# Patient Record
Sex: FEMALE | Race: WHITE | NOT HISPANIC OR LATINO | URBAN - METROPOLITAN AREA
[De-identification: names, ages, dates, MRNs, and addresses within clinical notes are randomized per-mention and may not be internally consistent; named-entity substitution may affect disease eponyms.]

---

## 2020-07-16 ENCOUNTER — OFFICE VISIT (OUTPATIENT)
Dept: URBAN - METROPOLITAN AREA CLINIC 113 | Facility: CLINIC | Age: 50
End: 2020-07-16

## 2020-07-25 ENCOUNTER — TELEPHONE ENCOUNTER (OUTPATIENT)
Dept: URBAN - METROPOLITAN AREA CLINIC 13 | Facility: CLINIC | Age: 50
End: 2020-07-25

## 2020-07-26 ENCOUNTER — TELEPHONE ENCOUNTER (OUTPATIENT)
Dept: URBAN - METROPOLITAN AREA CLINIC 13 | Facility: CLINIC | Age: 50
End: 2020-07-26

## 2020-07-26 RX ORDER — CHOLECALCIFEROL (VITAMIN D3) 125 MCG
TAKE 1 CAPSULE DAILY CAPSULE ORAL
Refills: 0 | Status: ACTIVE | COMMUNITY

## 2020-07-26 RX ORDER — LEVOTHYROXINE SODIUM 150 UG/1
TAKE 1 TABLET DAILY TABLET ORAL
Refills: 0 | Status: ACTIVE | COMMUNITY

## 2020-07-26 RX ORDER — ESOMEPRAZOLE MAGNESIUM 40 MG
TAKE 1 CAPSULE ONCE DAILY CAPSULE,DELAYED RELEASE (ENTERIC COATED) ORAL
Refills: 0 | Status: ACTIVE | COMMUNITY

## 2020-07-26 RX ORDER — VALACYCLOVIR HYDROCHLORIDE 500 MG/1
TAKE 2 TABLET DAILY TABLET, FILM COATED ORAL
Refills: 0 | Status: ACTIVE | COMMUNITY
Start: 2020-01-13

## 2020-07-26 RX ORDER — FAMOTIDINE 20 MG/1
TAKE 1 TABLET DAILY AS DIRECTED TABLET ORAL
Qty: 30 | Refills: 0 | Status: ACTIVE | COMMUNITY

## 2020-07-26 RX ORDER — ATENOLOL 25 MG/1
TAKE 1 TABLET TWICE DAILY TABLET ORAL
Refills: 0 | Status: ACTIVE | COMMUNITY
Start: 2020-02-03

## 2020-07-26 RX ORDER — DOCUSATE SODIUM 100 MG/1
TAKE 1 TABLET DAILY AS DIRECTED. UNKNOWN DOSAGE TABLET ORAL
Refills: 0 | Status: ACTIVE | COMMUNITY

## 2020-07-26 RX ORDER — CHLORHEXIDINE GLUCONATE 4 %
TAKE 1 TABLET DAILY AS DIRECTED LIQUID (ML) TOPICAL
Refills: 0 | Status: ACTIVE | COMMUNITY

## 2020-07-26 RX ORDER — GABAPENTIN 300 MG/1
TAKE 1 CAPSULE TWICE DAILY CAPSULE ORAL
Refills: 0 | Status: ACTIVE | COMMUNITY

## 2020-07-26 RX ORDER — PREDNISONE 5 MG/1
TAKE AS DIRECTED.  PRN TABLET ORAL
Refills: 0 | Status: ACTIVE | COMMUNITY
Start: 2020-02-03

## 2020-07-26 RX ORDER — BUPROPION HYDROCHLORIDE 150 MG/1
TABLET, FILM COATED, EXTENDED RELEASE ORAL
Qty: 30 | Refills: 0 | Status: ACTIVE | COMMUNITY
Start: 2020-06-16

## 2020-07-28 ENCOUNTER — OFFICE VISIT (OUTPATIENT)
Dept: URBAN - METROPOLITAN AREA SURGERY CENTER 25 | Facility: SURGERY CENTER | Age: 50
End: 2020-07-28

## 2020-08-18 ENCOUNTER — CLAIMS CREATED FROM THE CLAIM WINDOW (OUTPATIENT)
Dept: URBAN - METROPOLITAN AREA CLINIC 4 | Facility: CLINIC | Age: 50
End: 2020-08-18
Payer: MEDICARE

## 2020-08-18 ENCOUNTER — OFFICE VISIT (OUTPATIENT)
Dept: URBAN - METROPOLITAN AREA SURGERY CENTER 25 | Facility: SURGERY CENTER | Age: 50
End: 2020-08-18
Payer: MEDICARE

## 2020-08-18 DIAGNOSIS — K21.9 ACID REFLUX: ICD-10-CM

## 2020-08-18 DIAGNOSIS — K22.8 OTHER SPECIFIED DISEASES OF ESOPHAGUS: ICD-10-CM

## 2020-08-18 DIAGNOSIS — K21.0 GASTRO-ESOPHAGEAL REFLUX DISEASE WITH ESOPHAGITIS: ICD-10-CM

## 2020-08-18 DIAGNOSIS — K31.89 ACQUIRED DEFORMITY OF DUODENUM: ICD-10-CM

## 2020-08-18 DIAGNOSIS — K29.60 OTHER GASTRITIS WITHOUT BLEEDING: ICD-10-CM

## 2020-08-18 PROCEDURE — 43239 EGD BIOPSY SINGLE/MULTIPLE: CPT | Performed by: INTERNAL MEDICINE

## 2020-08-18 PROCEDURE — 88312 SPECIAL STAINS GROUP 1: CPT | Performed by: PATHOLOGY

## 2020-08-18 PROCEDURE — 88305 TISSUE EXAM BY PATHOLOGIST: CPT | Performed by: PATHOLOGY

## 2020-08-18 PROCEDURE — G8907 PT DOC NO EVENTS ON DISCHARG: HCPCS | Performed by: INTERNAL MEDICINE

## 2020-08-18 RX ORDER — ATENOLOL 25 MG/1
TAKE 1 TABLET TWICE DAILY TABLET ORAL
Refills: 0 | Status: ACTIVE | COMMUNITY
Start: 2020-02-03

## 2020-08-18 RX ORDER — PREDNISONE 5 MG/1
TAKE AS DIRECTED.  PRN TABLET ORAL
Refills: 0 | Status: ACTIVE | COMMUNITY
Start: 2020-02-03

## 2020-08-18 RX ORDER — DOCUSATE SODIUM 100 MG/1
TAKE 1 TABLET DAILY AS DIRECTED. UNKNOWN DOSAGE TABLET ORAL
Refills: 0 | Status: ACTIVE | COMMUNITY

## 2020-08-18 RX ORDER — FAMOTIDINE 20 MG/1
TAKE 1 TABLET DAILY AS DIRECTED TABLET ORAL
Qty: 30 | Refills: 0 | Status: ACTIVE | COMMUNITY

## 2020-08-18 RX ORDER — CHLORHEXIDINE GLUCONATE 4 %
TAKE 1 TABLET DAILY AS DIRECTED LIQUID (ML) TOPICAL
Refills: 0 | Status: ACTIVE | COMMUNITY

## 2020-08-18 RX ORDER — GABAPENTIN 300 MG/1
TAKE 1 CAPSULE TWICE DAILY CAPSULE ORAL
Refills: 0 | Status: ACTIVE | COMMUNITY

## 2020-08-18 RX ORDER — VALACYCLOVIR HYDROCHLORIDE 500 MG/1
TAKE 2 TABLET DAILY TABLET, FILM COATED ORAL
Refills: 0 | Status: ACTIVE | COMMUNITY
Start: 2020-01-13

## 2020-08-18 RX ORDER — LEVOTHYROXINE SODIUM 150 UG/1
TAKE 1 TABLET DAILY TABLET ORAL
Refills: 0 | Status: ACTIVE | COMMUNITY

## 2020-08-18 RX ORDER — BUPROPION HYDROCHLORIDE 150 MG/1
TABLET, FILM COATED, EXTENDED RELEASE ORAL
Qty: 30 | Refills: 0 | Status: ACTIVE | COMMUNITY
Start: 2020-06-16

## 2020-08-18 RX ORDER — ESOMEPRAZOLE MAGNESIUM 40 MG
TAKE 1 CAPSULE ONCE DAILY CAPSULE,DELAYED RELEASE (ENTERIC COATED) ORAL
Refills: 0 | Status: ACTIVE | COMMUNITY

## 2020-08-18 RX ORDER — CHOLECALCIFEROL (VITAMIN D3) 125 MCG
TAKE 1 CAPSULE DAILY CAPSULE ORAL
Refills: 0 | Status: ACTIVE | COMMUNITY

## 2020-08-20 ENCOUNTER — TELEPHONE ENCOUNTER (OUTPATIENT)
Dept: URBAN - METROPOLITAN AREA CLINIC 113 | Facility: CLINIC | Age: 50
End: 2020-08-20

## 2020-08-21 ENCOUNTER — OFFICE VISIT (OUTPATIENT)
Dept: URBAN - METROPOLITAN AREA SURGERY CENTER 25 | Facility: SURGERY CENTER | Age: 50
End: 2020-08-21

## 2020-10-02 ENCOUNTER — OFFICE VISIT (OUTPATIENT)
Dept: URBAN - METROPOLITAN AREA SURGERY CENTER 25 | Facility: SURGERY CENTER | Age: 50
End: 2020-10-02

## 2020-10-21 ENCOUNTER — OFFICE VISIT (OUTPATIENT)
Dept: URBAN - METROPOLITAN AREA CLINIC 113 | Facility: CLINIC | Age: 50
End: 2020-10-21

## 2023-09-25 ENCOUNTER — OFFICE VISIT (OUTPATIENT)
Dept: URBAN - METROPOLITAN AREA CLINIC 113 | Facility: CLINIC | Age: 53
End: 2023-09-25

## 2023-10-11 ENCOUNTER — OFFICE VISIT (OUTPATIENT)
Dept: URBAN - METROPOLITAN AREA CLINIC 113 | Facility: CLINIC | Age: 53
End: 2023-10-11
Payer: MEDICARE

## 2023-10-11 VITALS
DIASTOLIC BLOOD PRESSURE: 98 MMHG | TEMPERATURE: 97.2 F | WEIGHT: 278 LBS | BODY MASS INDEX: 42.13 KG/M2 | HEART RATE: 55 BPM | SYSTOLIC BLOOD PRESSURE: 127 MMHG | HEIGHT: 68 IN

## 2023-10-11 DIAGNOSIS — K21.9 GASTROESOPHAGEAL REFLUX DISEASE, UNSPECIFIED WHETHER ESOPHAGITIS PRESENT: ICD-10-CM

## 2023-10-11 DIAGNOSIS — K57.92 DIVERTICULITIS: ICD-10-CM

## 2023-10-11 DIAGNOSIS — R74.8 ELEVATED LIVER ENZYMES: ICD-10-CM

## 2023-10-11 DIAGNOSIS — K59.01 CONSTIPATION: ICD-10-CM

## 2023-10-11 PROBLEM — 235595009: Status: ACTIVE | Noted: 2023-10-11

## 2023-10-11 PROBLEM — 1231824009: Status: ACTIVE | Noted: 2023-10-11

## 2023-10-11 PROBLEM — 14760008: Status: ACTIVE | Noted: 2023-10-11

## 2023-10-11 PROBLEM — 307496006: Status: ACTIVE | Noted: 2023-10-11

## 2023-10-11 PROCEDURE — 99204 OFFICE O/P NEW MOD 45 MIN: CPT

## 2023-10-11 RX ORDER — GABAPENTIN 300 MG/1
TAKE 1 CAPSULE TWICE DAILY CAPSULE ORAL
Refills: 0 | Status: ACTIVE | COMMUNITY

## 2023-10-11 RX ORDER — PLECANATIDE 3 MG/1
1 TABLET TABLET ORAL ONCE A DAY
Qty: 90 | Refills: 3 | OUTPATIENT
Start: 2023-10-11 | End: 2024-10-05

## 2023-10-11 RX ORDER — CHOLECALCIFEROL (VITAMIN D3) 125 MCG
TAKE 1 CAPSULE DAILY CAPSULE ORAL
Refills: 0 | Status: ACTIVE | COMMUNITY

## 2023-10-11 RX ORDER — DOCUSATE SODIUM 100 MG/1
TAKE 1 TABLET DAILY AS DIRECTED. UNKNOWN DOSAGE TABLET ORAL
Refills: 0 | Status: ACTIVE | COMMUNITY

## 2023-10-11 RX ORDER — CHLORHEXIDINE GLUCONATE 4 %
TAKE 1 TABLET DAILY AS DIRECTED LIQUID (ML) TOPICAL
Refills: 0 | Status: ACTIVE | COMMUNITY

## 2023-10-11 RX ORDER — ATENOLOL 25 MG/1
TAKE 1 TABLET TWICE DAILY TABLET ORAL
Refills: 0 | Status: ACTIVE | COMMUNITY
Start: 2020-02-03

## 2023-10-11 RX ORDER — FAMOTIDINE 20 MG/1
TAKE 1 TABLET DAILY AS DIRECTED TABLET ORAL
OUTPATIENT

## 2023-10-11 RX ORDER — FAMOTIDINE 20 MG/1
TAKE 1 TABLET DAILY AS DIRECTED TABLET ORAL
Qty: 30 | Refills: 0 | Status: ACTIVE | COMMUNITY

## 2023-10-11 RX ORDER — LEVOTHYROXINE SODIUM 150 UG/1
TAKE 1 TABLET DAILY TABLET ORAL
Refills: 0 | Status: ACTIVE | COMMUNITY

## 2023-10-11 RX ORDER — BUPROPION HYDROCHLORIDE 150 MG/1
TABLET, FILM COATED, EXTENDED RELEASE ORAL
Qty: 30 | Refills: 0 | Status: ON HOLD | COMMUNITY
Start: 2020-06-16

## 2023-10-11 RX ORDER — PREDNISONE 5 MG/1
TAKE AS DIRECTED.  PRN TABLET ORAL
Refills: 0 | Status: ACTIVE | COMMUNITY
Start: 2020-02-03

## 2023-10-11 RX ORDER — ESOMEPRAZOLE MAGNESIUM 40 MG
TAKE 1 CAPSULE ONCE DAILY CAPSULE,DELAYED RELEASE (ENTERIC COATED) ORAL
Refills: 0 | Status: ACTIVE | COMMUNITY

## 2023-10-11 RX ORDER — ESOMEPRAZOLE MAGNESIUM 40 MG
TAKE 1 CAPSULE ONCE DAILY CAPSULE,DELAYED RELEASE (ENTERIC COATED) ORAL
OUTPATIENT

## 2023-10-11 RX ORDER — VALACYCLOVIR HYDROCHLORIDE 500 MG/1
TAKE 2 TABLET DAILY TABLET, FILM COATED ORAL
Refills: 0 | Status: ACTIVE | COMMUNITY
Start: 2020-01-13

## 2023-10-11 NOTE — HPI-TODAY'S VISIT:
53-year-old female with a history of anemia, reflux, fibromyalgia, prior cholecystectomy, Hashimoto's thyroiditis, rheumatoid arthritis, Sjogren's syndrome, Raynaud's syndrome, pheochromocytoma status post left adrenalectomy, PVCs, lung nodules related to RA, splenomegaly, cardiomegaly, irritable bowel syndrome, fatty liver disease, PAD presents for evaluation of her liver.   She was originally seen by Dr. Benoit in 2020 for multiple GI complaints.  She was recommended an EGD and colonoscopy.  She underwent EGD on 8/18/2020.  This revealed a normal esophagus and normal duodenum.  There was a medium amount of food residue in the stomach as well as gastritis.  Gastric biopsies revealed chemical/reactive gastropathy without evidence of H. pylori.  Esophageal biopsies revealed reflux type changes without evidence of Delgado's esophagus or eosinophilic esophagitis.  She had been lost to follow-up and never underwent colonoscopy. Upper GI series 5/20/2023:Moderate hiatal hernia with moderate to large volume gastroesophageal reflux to the level of the thoracic inlet. CT scan of the abdomen/pelvis 9/21/2023: Prior cholecystectomy.  Mild distal descending colonic diverticulitis.  Noncalcified less than 5 mm nodules in the lung bases are stable compared to 2020 consistent with benign etiology.  Stable postsurgical changes in the right kidney. Labs 9/14/2023:Unremarkable urinalysis, negative urine culture, positive COVID test. Labs 8/9/2023:Normal BNP, hemoglobin A1c 6.1, unremarkable BMP, normal lipid panel, low TSH of 0.341, normal T4, normal microalbumin.  She has had fatty liver disease for the past few years. She had an US with elastography which showed mild fibrosis. Dr. Cantrell has been watching her LFTs. She has been following a KETO diet. She has lost almost 20 pounds. Her LFTs "have improved by half." She had an episode of diverticulitis in September. This was described as severe LLQ. This resovled with Augmentin. She has had several colonoscopies in the past. Her last colonoscopy was in 2016/2017. This was notable for a benign polyp. Her brother everardo passed away. She does not drink ETOH.  She does have constipation predominant bowel habits. She takes Sennakot 1-2 times per night. SHe takes fiber every night as well. She typically has diarrhea predominant bowel habits however she has become constipated since beginning her keto diet. She is considering undergoing gastric sleeve with Dr. Erick Rivera. She does have generalized abdominal pain and bloating. The bloating can occur with a sip of water, per patient. She is very stressed at home. Her dog just had 11 puppies and she is caring for all of her children and .

## 2023-10-12 LAB
A/G RATIO: 1.8
ABSOLUTE BASOPHILS: 49
ABSOLUTE EOSINOPHILS: 211
ABSOLUTE LYMPHOCYTES: 1858
ABSOLUTE MONOCYTES: 529
ABSOLUTE NEUTROPHILS: 2754
ALBUMIN: 4.2
ALKALINE PHOSPHATASE: 61
ALT (SGPT): 55
AST (SGOT): 36
BASOPHILS: 0.9
BILIRUBIN, TOTAL: 1.1
BUN/CREATININE RATIO: (no result)
BUN: 18
CALCIUM: 9.3
CARBON DIOXIDE, TOTAL: 24
CHLORIDE: 106
CREATININE: 0.83
EGFR: 84
EOSINOPHILS: 3.9
GLOBULIN, TOTAL: 2.3
GLUCOSE: 83
HEMATOCRIT: 43.3
HEMOGLOBIN: 14.2
LYMPHOCYTES: 34.4
MCH: 29.6
MCHC: 32.8
MCV: 90.2
MONOCYTES: 9.8
MPV: 11.4
NEUTROPHILS: 51
PLATELET COUNT: 187
POTASSIUM: 4.2
PROTEIN, TOTAL: 6.5
RDW: 13
RED BLOOD CELL COUNT: 4.8
SODIUM: 142
WHITE BLOOD CELL COUNT: 5.4

## 2023-10-27 ENCOUNTER — CLAIMS CREATED FROM THE CLAIM WINDOW (OUTPATIENT)
Dept: URBAN - METROPOLITAN AREA SURGERY CENTER 25 | Facility: SURGERY CENTER | Age: 53
End: 2023-10-27
Payer: MEDICARE

## 2023-10-27 ENCOUNTER — CLAIMS CREATED FROM THE CLAIM WINDOW (OUTPATIENT)
Dept: URBAN - METROPOLITAN AREA CLINIC 4 | Facility: CLINIC | Age: 53
End: 2023-10-27
Payer: MEDICARE

## 2023-10-27 DIAGNOSIS — K63.89 OTHER SPECIFIED DISEASES OF INTESTINE: ICD-10-CM

## 2023-10-27 DIAGNOSIS — D12.2 ADENOMATOUS POLYP OF ASCENDING COLON: ICD-10-CM

## 2023-10-27 DIAGNOSIS — Z12.11 COLON CANCER SCREENING (HIGH RISK): ICD-10-CM

## 2023-10-27 DIAGNOSIS — K64.1 SECOND DEGREE HEMORRHOIDS: ICD-10-CM

## 2023-10-27 DIAGNOSIS — K57.30 COLON, DIVERTICULOSIS: ICD-10-CM

## 2023-10-27 DIAGNOSIS — Z12.11 COLON CANCER SCREENING: ICD-10-CM

## 2023-10-27 PROCEDURE — G8907 PT DOC NO EVENTS ON DISCHARG: HCPCS | Performed by: INTERNAL MEDICINE

## 2023-10-27 PROCEDURE — 88305 TISSUE EXAM BY PATHOLOGIST: CPT | Performed by: PATHOLOGY

## 2023-10-27 PROCEDURE — 45380 COLONOSCOPY AND BIOPSY: CPT | Performed by: INTERNAL MEDICINE

## 2023-10-27 PROCEDURE — 00811 ANES LWR INTST NDSC NOS: CPT | Performed by: ANESTHESIOLOGIST ASSISTANT

## 2023-10-27 PROCEDURE — 00811 ANES LWR INTST NDSC NOS: CPT | Performed by: ANESTHESIOLOGY

## 2023-10-27 RX ORDER — GABAPENTIN 300 MG/1
TAKE 1 CAPSULE TWICE DAILY CAPSULE ORAL
Refills: 0 | Status: ACTIVE | COMMUNITY

## 2023-10-27 RX ORDER — DOCUSATE SODIUM 100 MG/1
TAKE 1 TABLET DAILY AS DIRECTED. UNKNOWN DOSAGE TABLET ORAL
Refills: 0 | Status: ACTIVE | COMMUNITY

## 2023-10-27 RX ORDER — PLECANATIDE 3 MG/1
1 TABLET TABLET ORAL ONCE A DAY
Qty: 90 | Refills: 3 | Status: ACTIVE | COMMUNITY
Start: 2023-10-11 | End: 2024-10-05

## 2023-10-27 RX ORDER — FAMOTIDINE 20 MG/1
TAKE 1 TABLET DAILY AS DIRECTED TABLET ORAL
Status: ACTIVE | COMMUNITY

## 2023-10-27 RX ORDER — LEVOTHYROXINE SODIUM 150 UG/1
TAKE 1 TABLET DAILY TABLET ORAL
Refills: 0 | Status: ACTIVE | COMMUNITY

## 2023-10-27 RX ORDER — CHLORHEXIDINE GLUCONATE 4 %
TAKE 1 TABLET DAILY AS DIRECTED LIQUID (ML) TOPICAL
Refills: 0 | Status: ACTIVE | COMMUNITY

## 2023-10-27 RX ORDER — PREDNISONE 5 MG/1
TAKE AS DIRECTED.  PRN TABLET ORAL
Refills: 0 | Status: ACTIVE | COMMUNITY
Start: 2020-02-03

## 2023-10-27 RX ORDER — BUPROPION HYDROCHLORIDE 150 MG/1
TABLET, FILM COATED, EXTENDED RELEASE ORAL
Qty: 30 | Refills: 0 | Status: ON HOLD | COMMUNITY
Start: 2020-06-16

## 2023-10-27 RX ORDER — CHOLECALCIFEROL (VITAMIN D3) 125 MCG
TAKE 1 CAPSULE DAILY CAPSULE ORAL
Refills: 0 | Status: ACTIVE | COMMUNITY

## 2023-10-27 RX ORDER — ATENOLOL 25 MG/1
TAKE 1 TABLET TWICE DAILY TABLET ORAL
Refills: 0 | Status: ACTIVE | COMMUNITY
Start: 2020-02-03

## 2023-10-27 RX ORDER — ESOMEPRAZOLE MAGNESIUM 40 MG
TAKE 1 CAPSULE ONCE DAILY CAPSULE,DELAYED RELEASE (ENTERIC COATED) ORAL
Status: ACTIVE | COMMUNITY

## 2023-10-27 RX ORDER — VALACYCLOVIR HYDROCHLORIDE 500 MG/1
TAKE 2 TABLET DAILY TABLET, FILM COATED ORAL
Refills: 0 | Status: ACTIVE | COMMUNITY
Start: 2020-01-13

## 2023-12-11 ENCOUNTER — OFFICE VISIT (OUTPATIENT)
Dept: URBAN - METROPOLITAN AREA CLINIC 113 | Facility: CLINIC | Age: 53
End: 2023-12-11

## 2024-01-24 ENCOUNTER — OFFICE VISIT (OUTPATIENT)
Dept: URBAN - METROPOLITAN AREA CLINIC 113 | Facility: CLINIC | Age: 54
End: 2024-01-24
Payer: MEDICARE

## 2024-01-24 VITALS
WEIGHT: 271.2 LBS | DIASTOLIC BLOOD PRESSURE: 80 MMHG | HEART RATE: 69 BPM | RESPIRATION RATE: 20 BRPM | BODY MASS INDEX: 41.1 KG/M2 | HEIGHT: 68 IN | TEMPERATURE: 97.7 F | SYSTOLIC BLOOD PRESSURE: 125 MMHG

## 2024-01-24 DIAGNOSIS — K21.9 GASTROESOPHAGEAL REFLUX DISEASE, UNSPECIFIED WHETHER ESOPHAGITIS PRESENT: ICD-10-CM

## 2024-01-24 DIAGNOSIS — K57.92 DIVERTICULITIS: ICD-10-CM

## 2024-01-24 DIAGNOSIS — R14.0 ABDOMINAL BLOATING: ICD-10-CM

## 2024-01-24 PROCEDURE — 76700 US EXAM ABDOM COMPLETE: CPT

## 2024-01-24 PROCEDURE — 99214 OFFICE O/P EST MOD 30 MIN: CPT

## 2024-01-24 RX ORDER — PREDNISONE 5 MG/1
TAKE AS DIRECTED.  PRN TABLET ORAL
Refills: 0 | Status: ACTIVE | COMMUNITY
Start: 2020-02-03

## 2024-01-24 RX ORDER — PLECANATIDE 3 MG/1
1 TABLET TABLET ORAL ONCE A DAY
Qty: 90 | Refills: 3 | OUTPATIENT

## 2024-01-24 RX ORDER — ATENOLOL 50 MG/1
1 TABLET TABLET ORAL ONCE A DAY
Status: ACTIVE | COMMUNITY

## 2024-01-24 RX ORDER — FAMOTIDINE 40 MG/1
1 TABLET AT BEDTIME TABLET, FILM COATED ORAL ONCE A DAY
Qty: 90 TABLET | Refills: 3 | OUTPATIENT

## 2024-01-24 RX ORDER — CHOLECALCIFEROL (VITAMIN D3) 125 MCG
TAKE 1 CAPSULE DAILY CAPSULE ORAL
Refills: 0 | Status: ACTIVE | COMMUNITY

## 2024-01-24 RX ORDER — LEVOTHYROXINE SODIUM 150 UG/1
TAKE 1 TABLET DAILY TABLET ORAL
Refills: 0 | Status: ACTIVE | COMMUNITY

## 2024-01-24 RX ORDER — VALACYCLOVIR HYDROCHLORIDE 500 MG/1
TAKE 2 TABLET DAILY TABLET, FILM COATED ORAL
Refills: 0 | Status: ACTIVE | COMMUNITY
Start: 2020-01-13

## 2024-01-24 RX ORDER — ESOMEPRAZOLE MAGNESIUM 40 MG
1 CAPSULE CAPSULE,DELAYED RELEASE (ENTERIC COATED) ORAL ONCE A DAY
Qty: 90 CAPSULE | Refills: 3

## 2024-01-24 RX ORDER — DOCUSATE SODIUM 100 MG/1
TAKE 1 TABLET DAILY AS DIRECTED. UNKNOWN DOSAGE TABLET ORAL
Refills: 0 | Status: ACTIVE | COMMUNITY

## 2024-01-24 RX ORDER — CHLORHEXIDINE GLUCONATE 4 %
TAKE 1 TABLET DAILY AS DIRECTED LIQUID (ML) TOPICAL
Refills: 0 | Status: ACTIVE | COMMUNITY

## 2024-01-24 RX ORDER — PLECANATIDE 3 MG/1
1 TABLET TABLET ORAL ONCE A DAY
Qty: 90 | Refills: 3 | Status: ACTIVE | COMMUNITY
Start: 2023-10-11 | End: 2024-10-05

## 2024-01-24 RX ORDER — BUPROPION HYDROCHLORIDE 150 MG/1
TABLET, FILM COATED, EXTENDED RELEASE ORAL
Qty: 30 | Refills: 0 | Status: ON HOLD | COMMUNITY
Start: 2020-06-16

## 2024-01-24 RX ORDER — GABAPENTIN 600 MG/1
1 TABLET TABLET, FILM COATED ORAL ONCE A DAY
Status: ACTIVE | COMMUNITY

## 2024-01-24 RX ORDER — ESOMEPRAZOLE MAGNESIUM 40 MG
TAKE 1 CAPSULE ONCE DAILY CAPSULE,DELAYED RELEASE (ENTERIC COATED) ORAL
Status: ACTIVE | COMMUNITY

## 2024-01-24 RX ORDER — FAMOTIDINE 20 MG/1
TAKE 1 TABLET DAILY AS DIRECTED TABLET ORAL
Status: ACTIVE | COMMUNITY

## 2024-01-24 NOTE — HPI-TODAY'S VISIT:
53-year-old female presents for follow-up after colonoscopy.  He was last seen 10/11/2023.  She is planned for labs as well as diagnostic colonoscopy and started on Trulance for constipation.  Colonoscopy 10/27/2023:Performed without difficulty.  BBPS score of 9.  Grade 2 nonbleeding internal hemorrhoids.  Diverticulosis in the sigmoid colon.  Removal of one 4 mm polyp in the ascending colon.  Pathology revealed prominent mucosal lymphoid aggregates.  Distal rectum and anal verge are normal.  Repeat in 10 years for surveillance.  Labs 10/11/2023:Normal total bilirubin normal alk phos, AST 36, ALT 55, normal CBC.  She has had to double up her Famotidine 20 mg at night due to breakthrough reflux. She did gain weight over the holidays however our records show she is still 6-7 lb lighter. She tried Trulance on 3 occasions which caused urgency and diarrhea. She contnues to take Sennakot at night.   10/11/2023: 53-year-old female with a history of anemia, reflux, fibromyalgia, prior cholecystectomy, Hashimoto's thyroiditis, rheumatoid arthritis, Sjogren's syndrome, Raynaud's syndrome, pheochromocytoma status post left adrenalectomy, PVCs, lung nodules related to RA, splenomegaly, cardiomegaly, irritable bowel syndrome, fatty liver disease, PAD presents for evaluation of her liver.   She was originally seen by Dr. Benoit in 2020 for multiple GI complaints.  She was recommended an EGD and colonoscopy.  She underwent EGD on 8/18/2020.  This revealed a normal esophagus and normal duodenum.  There was a medium amount of food residue in the stomach as well as gastritis.  Gastric biopsies revealed chemical/reactive gastropathy without evidence of H. pylori.  Esophageal biopsies revealed reflux type changes without evidence of Delgado's esophagus or eosinophilic esophagitis.  She had been lost to follow-up and never underwent colonoscopy. Upper GI series 5/20/2023:Moderate hiatal hernia with moderate to large volume gastroesophageal reflux to the level of the thoracic inlet. CT scan of the abdomen/pelvis 9/21/2023: Prior cholecystectomy.  Mild distal descending colonic diverticulitis.  Noncalcified less than 5 mm nodules in the lung bases are stable compared to 2020 consistent with benign etiology.  Stable postsurgical changes in the right kidney. Labs 9/14/2023:Unremarkable urinalysis, negative urine culture, positive COVID test. Labs 8/9/2023:Normal BNP, hemoglobin A1c 6.1, unremarkable BMP, normal lipid panel, low TSH of 0.341, normal T4, normal microalbumin.  She has had fatty liver disease for the past few years. She had an US with elastography which showed mild fibrosis. Dr. Cantrell has been watching her LFTs. She has been following a KETO diet. She has lost almost 20 pounds. Her LFTs "have improved by half." She had an episode of diverticulitis in September. This was described as severe LLQ. This resovled with Augmentin. She has had several colonoscopies in the past. Her last colonoscopy was in 2016/2017. This was notable for a benign polyp. Her brother everardo passed away. She does not drink ETOH.  She does have constipation predominant bowel habits. She takes Sennakot 1-2 times per night. SHe takes fiber every night as well. She typically has diarrhea predominant bowel habits however she has become constipated since beginning her keto diet. She is considering undergoing gastric sleeve with Dr. Erick Rivera. She does have generalized abdominal pain and bloating. The bloating can occur with a sip of water, per patient. She is very stressed at home. Her dog just had 11 puppies and she is caring for all of her children and .

## 2024-04-24 ENCOUNTER — OV EP (OUTPATIENT)
Dept: URBAN - METROPOLITAN AREA CLINIC 113 | Facility: CLINIC | Age: 54
End: 2024-04-24
Payer: MEDICARE

## 2024-04-24 VITALS
WEIGHT: 281 LBS | HEART RATE: 87 BPM | DIASTOLIC BLOOD PRESSURE: 84 MMHG | BODY MASS INDEX: 42.59 KG/M2 | HEIGHT: 68 IN | TEMPERATURE: 97.7 F | RESPIRATION RATE: 16 BRPM | SYSTOLIC BLOOD PRESSURE: 120 MMHG

## 2024-04-24 DIAGNOSIS — K76.0 NAFLD (NONALCOHOLIC FATTY LIVER DISEASE): ICD-10-CM

## 2024-04-24 DIAGNOSIS — K57.32 DIVERTICULITIS LARGE INTESTINE: ICD-10-CM

## 2024-04-24 DIAGNOSIS — K59.09 CHRONIC CONSTIPATION: ICD-10-CM

## 2024-04-24 DIAGNOSIS — K21.9 CHRONIC GERD: ICD-10-CM

## 2024-04-24 PROCEDURE — 99213 OFFICE O/P EST LOW 20 MIN: CPT

## 2024-04-24 RX ORDER — PREDNISONE 5 MG/1
TAKE AS DIRECTED.  PRN TABLET ORAL
Refills: 0 | Status: ACTIVE | COMMUNITY
Start: 2020-02-03

## 2024-04-24 RX ORDER — CHLORHEXIDINE GLUCONATE 4 %
TAKE 1 TABLET DAILY AS DIRECTED LIQUID (ML) TOPICAL
Refills: 0 | Status: ACTIVE | COMMUNITY

## 2024-04-24 RX ORDER — ATENOLOL 50 MG/1
1 TABLET TABLET ORAL ONCE A DAY
Status: ACTIVE | COMMUNITY

## 2024-04-24 RX ORDER — FAMOTIDINE 40 MG/1
1 TABLET AT BEDTIME TABLET, FILM COATED ORAL ONCE A DAY
Qty: 90 TABLET | Refills: 3 | OUTPATIENT

## 2024-04-24 RX ORDER — CHOLECALCIFEROL (VITAMIN D3) 125 MCG
TAKE 1 CAPSULE DAILY CAPSULE ORAL
Refills: 0 | Status: ACTIVE | COMMUNITY

## 2024-04-24 RX ORDER — LUBIPROSTONE 8 UG/1
1 CAPSULE WITH FOOD AND WATER CAPSULE, GELATIN COATED ORAL TWICE A DAY
Qty: 180 CAPSULE | Refills: 1 | OUTPATIENT
Start: 2024-04-24 | End: 2024-10-21

## 2024-04-24 RX ORDER — VALACYCLOVIR HYDROCHLORIDE 500 MG/1
TAKE 2 TABLET DAILY TABLET, FILM COATED ORAL
Refills: 0 | Status: ACTIVE | COMMUNITY
Start: 2020-01-13

## 2024-04-24 RX ORDER — LEVOTHYROXINE SODIUM 150 UG/1
TAKE 1 TABLET DAILY TABLET ORAL
Refills: 0 | Status: ACTIVE | COMMUNITY

## 2024-04-24 RX ORDER — BUPROPION HYDROCHLORIDE 150 MG/1
TABLET, FILM COATED, EXTENDED RELEASE ORAL
Qty: 30 | Refills: 0 | Status: ON HOLD | COMMUNITY
Start: 2020-06-16

## 2024-04-24 RX ORDER — DOCUSATE SODIUM 100 MG/1
TAKE 1 TABLET DAILY AS DIRECTED. UNKNOWN DOSAGE TABLET ORAL
Refills: 0 | Status: ACTIVE | COMMUNITY

## 2024-04-24 RX ORDER — ESOMEPRAZOLE MAGNESIUM 40 MG
1 CAPSULE CAPSULE,DELAYED RELEASE (ENTERIC COATED) ORAL ONCE A DAY
Qty: 90 CAPSULE | Refills: 3

## 2024-04-24 RX ORDER — FAMOTIDINE 40 MG/1
1 TABLET AT BEDTIME TABLET, FILM COATED ORAL ONCE A DAY
Qty: 90 TABLET | Refills: 3 | Status: ACTIVE | COMMUNITY

## 2024-04-24 RX ORDER — GABAPENTIN 600 MG/1
1 TABLET TABLET, FILM COATED ORAL ONCE A DAY
Status: ACTIVE | COMMUNITY

## 2024-04-24 RX ORDER — ESOMEPRAZOLE MAGNESIUM 40 MG
1 CAPSULE CAPSULE,DELAYED RELEASE (ENTERIC COATED) ORAL ONCE A DAY
Qty: 90 CAPSULE | Refills: 3 | Status: ACTIVE | COMMUNITY

## 2024-04-24 RX ORDER — PLECANATIDE 3 MG/1
1 TABLET TABLET ORAL ONCE A DAY
Qty: 90 | Refills: 3 | Status: ON HOLD | COMMUNITY

## 2024-04-24 NOTE — HPI-TODAY'S VISIT:
54-year-old female presents for follow-up.  She was last seen on 1/24/2024.  She was advised to take Trulance consistently for few weeks as diarrhea should improve/resolve.  Her famotidine was increased to 40 mg at bedtime to aid her GERD.  Regarding her elevated liver enzymes she is planned for ultrasound with elastography.  Ultrasound with elastography 3/23/2024: Shear wave median velocity 1.18 M/S in other words normal shear wave velocity.  Hepatic steatosis.  Right renal atrophy.  Correlate for medical renal disease or other abnormality.  Labs 2/1/2024: normal CRP, normal cortisol, elevated TSH 7.49, normal T4.  She was informed of normal results and was to keep up efforts at weight loss.  Anusol prescription was cost prohibitive.  She was provided a compounded prescription sent to [facility] in [place].  She had to take steroids following an eye condition. THis caused her to gain alomst 12 pounds. Her reflux was improved prior to the weight gain. She is trying to lose weight again. She states Trulance continued to cause diarrhea. She also could not afford the medication (cost $900). She continues to take Sennakot for her constipation. If she does not take this, she will defecate shannon. She intermittenly has postprandial diarrhea which she attributes to her gallbladder removal.   1/24/2024: 53-year-old female presents for follow-up after colonoscopy.  He was last seen 10/11/2023.  She is planned for labs as well as diagnostic colonoscopy and started on Trulance for constipation.  Colonoscopy 10/27/2023:Performed without difficulty.  BBPS score of 9.  Grade 2 nonbleeding internal hemorrhoids.  Diverticulosis in the sigmoid colon.  Removal of one 4 mm polyp in the ascending colon.  Pathology revealed prominent mucosal lymphoid aggregates.  Distal rectum and anal verge are normal.  Repeat in 10 years for surveillance.  Labs 10/11/2023:Normal total bilirubin normal alk phos, AST 36, ALT 55, normal CBC.  She has had to double up her Famotidine 20 mg at night due to breakthrough reflux. She did gain weight over the holidays however our records show she is still 6-7 lb lighter. She tried Trulance on 3 occasions which caused urgency and diarrhea. She contnues to take Sennakot at night.   10/11/2023: 53-year-old female with a history of anemia, reflux, fibromyalgia, prior cholecystectomy, Hashimoto's thyroiditis, rheumatoid arthritis, Sjogren's syndrome, Raynaud's syndrome, pheochromocytoma status post left adrenalectomy, PVCs, lung nodules related to RA, splenomegaly, cardiomegaly, irritable bowel syndrome, fatty liver disease, PAD presents for evaluation of her liver.   She was originally seen by Dr. Benoit in 2020 for multiple GI complaints.  She was recommended an EGD and colonoscopy.  She underwent EGD on 8/18/2020.  This revealed a normal esophagus and normal duodenum.  There was a medium amount of food residue in the stomach as well as gastritis.  Gastric biopsies revealed chemical/reactive gastropathy without evidence of H. pylori.  Esophageal biopsies revealed reflux type changes without evidence of Delgado's esophagus or eosinophilic esophagitis.  She had been lost to follow-up and never underwent colonoscopy. Upper GI series 5/20/2023:Moderate hiatal hernia with moderate to large volume gastroesophageal reflux to the level of the thoracic inlet. CT scan of the abdomen/pelvis 9/21/2023: Prior cholecystectomy.  Mild distal descending colonic diverticulitis.  Noncalcified less than 5 mm nodules in the lung bases are stable compared to 2020 consistent with benign etiology.  Stable postsurgical changes in the right kidney. Labs 9/14/2023:Unremarkable urinalysis, negative urine culture, positive COVID test. Labs 8/9/2023:Normal BNP, hemoglobin A1c 6.1, unremarkable BMP, normal lipid panel, low TSH of 0.341, normal T4, normal microalbumin.  She has had fatty liver disease for the past few years. She had an US with elastography which showed mild fibrosis. Dr. Cantrell has been watching her LFTs. She has been following a KETO diet. She has lost almost 20 pounds. Her LFTs "have improved by half." She had an episode of diverticulitis in September. This was described as severe LLQ. This resovled with Augmentin. She has had several colonoscopies in the past. Her last colonoscopy was in 2016/2017. This was notable for a benign polyp. Her brother everardo passed away. She does not drink ETOH.  She does have constipation predominant bowel habits. She takes Sennakot 1-2 times per night. SHe takes fiber every night as well. She typically has diarrhea predominant bowel habits however she has become constipated since beginning her keto diet. She is considering undergoing gastric sleeve with Dr. Erick Rivera. She does have generalized abdominal pain and bloating. The bloating can occur with a sip of water, per patient. She is very stressed at home. Her dog just had 11 puppies and she is caring for all of her children and .

## 2024-07-24 ENCOUNTER — OFFICE VISIT (OUTPATIENT)
Dept: URBAN - METROPOLITAN AREA CLINIC 113 | Facility: CLINIC | Age: 54
End: 2024-07-24

## 2024-11-10 ENCOUNTER — ERX REFILL RESPONSE (OUTPATIENT)
Dept: URBAN - METROPOLITAN AREA CLINIC 113 | Facility: CLINIC | Age: 54
End: 2024-11-10

## 2024-11-10 RX ORDER — FAMOTIDINE 40 MG/1
1 TABLET AT BEDTIME TABLET, FILM COATED ORAL ONCE A DAY
Qty: 90 TABLET | Refills: 3 | OUTPATIENT

## 2024-11-10 RX ORDER — FAMOTIDINE 40 MG/1
TAKE 1 TABLET AT BEDTIME TABLET, FILM COATED ORAL
Qty: 90 TABLET | Refills: 0 | OUTPATIENT

## 2025-02-12 ENCOUNTER — ERX REFILL RESPONSE (OUTPATIENT)
Dept: URBAN - METROPOLITAN AREA CLINIC 113 | Facility: CLINIC | Age: 55
End: 2025-02-12

## 2025-02-12 RX ORDER — ESOMEPRAZOLE MAGNESIUM 40 MG/1
TAKE 1 CAPSULE EVERY DAY CAPSULE, DELAYED RELEASE ORAL
Qty: 90 CAPSULE | Refills: 0 | OUTPATIENT

## 2025-02-12 RX ORDER — ESOMEPRAZOLE MAGNESIUM 40 MG
1 CAPSULE CAPSULE,DELAYED RELEASE (ENTERIC COATED) ORAL ONCE A DAY
Qty: 90 CAPSULE | Refills: 3 | OUTPATIENT

## 2025-02-17 NOTE — HPI-TODAY'S VISIT:
53-year-old female presents for follow-up after colonoscopy.  He was last seen 10/11/2023.  She is planned for labs as well as diagnostic colonoscopy and started on Trulance for constipation. Colonoscopy 10/27/2023:Performed without difficulty.  BBPS score of 9.  Grade 2 nonbleeding internal hemorrhoids.  Diverticulosis in the sigmoid colon.  Removal of one 4 mm polyp in the ascending colon.  Pathology revealed prominent mucosal lymphoid aggregates.  Distal rectum and anal verge are normal.  Repeat in 10 years for surveillance. Labs 10/11/2023:Normal total bilirubin normal alk phos, AST 36, ALT 55, normal CBC. 10/11/2023 53-year-old female with a history of anemia, reflux, fibromyalgia, prior cholecystectomy, Hashimoto's thyroiditis, rheumatoid arthritis, Sjogren's syndrome, Raynaud's syndrome, pheochromocytoma status post left adrenalectomy, PVCs, lung nodules related to RA, splenomegaly, cardiomegaly, irritable bowel syndrome, fatty liver disease, PAD presents for evaluation of her liver.   She was originally seen by Dr. Benoit in 2020 for multiple GI complaints.  She was recommended an EGD and colonoscopy.  She underwent EGD on 8/18/2020.  This revealed a normal esophagus and normal duodenum.  There was a medium amount of food residue in the stomach as well as gastritis.  Gastric biopsies revealed chemical/reactive gastropathy without evidence of H. pylori.  Esophageal biopsies revealed reflux type changes without evidence of Delgado's esophagus or eosinophilic esophagitis.  She had been lost to follow-up and never underwent colonoscopy. Upper GI series 5/20/2023:Moderate hiatal hernia with moderate to large volume gastroesophageal reflux to the level of the thoracic inlet. CT scan of the abdomen/pelvis 9/21/2023: Prior cholecystectomy.  Mild distal descending colonic diverticulitis.  Noncalcified less than 5 mm nodules in the lung bases are stable compared to 2020 consistent with benign etiology.  Stable postsurgical changes in the right kidney. Labs 9/14/2023:Unremarkable urinalysis, negative urine culture, positive COVID test. Labs 8/9/2023:Normal BNP, hemoglobin A1c 6.1, unremarkable BMP, normal lipid panel, low TSH of 0.341, normal T4, normal microalbumin.  She has had fatty liver disease for the past few years. She had an US with elastography which showed mild fibrosis. Dr. Cantrell has been watching her LFTs. She has been following a KETO diet. She has lost almost 20 pounds. Her LFTs "have improved by half." She had an episode of diverticulitis in September. This was described as severe LLQ. This resovled with Augmentin. She has had several colonoscopies in the past. Her last colonoscopy was in 2016/2017. This was notable for a benign polyp. Her brother everardo passed away. She does not drink ETOH.  She does have constipation predominant bowel habits. She takes Sennakot 1-2 times per night. SHe takes fiber every night as well. She typically has diarrhea predominant bowel habits however she has become constipated since beginning her keto diet. She is considering undergoing gastric sleeve with Dr. Erick Rivera. She does have generalized abdominal pain and bloating. The bloating can occur with a sip of water, per patient. She is very stressed at home. Her dog just had 11 puppies and she is caring for all of her children and . A (GUIDEWIRE [facility] 260CM J SHAPE .035IN VASC NS LTX) guidewire was removed.

## 2025-03-27 ENCOUNTER — ERX REFILL RESPONSE (OUTPATIENT)
Dept: URBAN - METROPOLITAN AREA CLINIC 72 | Facility: CLINIC | Age: 55
End: 2025-03-27

## 2025-03-27 RX ORDER — FAMOTIDINE 40 MG/1
TAKE 1 TABLET AT BEDTIME TABLET, FILM COATED ORAL
Qty: 90 TABLET | Refills: 0

## 2025-06-27 ENCOUNTER — WEB ENCOUNTER (OUTPATIENT)
Dept: URBAN - METROPOLITAN AREA CLINIC 113 | Facility: CLINIC | Age: 55
End: 2025-06-27

## 2025-06-27 RX ORDER — AMOXICILLIN AND CLAVULANATE POTASSIUM 875; 125 MG/1; MG/1
1 TABLET TABLET, FILM COATED ORAL
Qty: 20 | Refills: 0 | OUTPATIENT
Start: 2025-06-27 | End: 2025-07-07

## 2025-06-27 RX ORDER — FLUCONAZOLE 150 MG/1
1 TABLET TABLET ORAL
Qty: 2 | Refills: 1 | OUTPATIENT
Start: 2025-06-27 | End: 2025-07-01